# Patient Record
(demographics unavailable — no encounter records)

---

## 2024-11-09 NOTE — DISCUSSION/SUMMARY
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 She is clear from her sarcoma though with continued implant issues. Some instability is noted, likely due to a fragment from her implant, though this has settled in position, though this is being well-managed. A note has been provided for school accommodations, and she will continue follow-up as needed, with the next visit scheduled in six months. She is also monitored by survivorship as well.  If imaging or pathology/biopsy was ordered, the patient was told to make an appointment to review findings right after all imaging is completed.   We discussed risks, benefits and alternatives. Rationale of care was reviewed and all questions were answered.

## 2024-11-09 NOTE — DATA REVIEWED
[Imaging Present] : Present [de-identified] : X-rays today (11/07/2024), multiple views of the right knee and right tibia, show the proximal tibia replacement in position. The small free-floating piece of metal is now next to the proximal tibia replacement and stem junction, which is where it has been before. The patella is still subluxated laterally and superiorly.

## 2024-11-09 NOTE — DISCUSSION/SUMMARY
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 She is clear from her sarcoma though with continued implant issues. Some instability is noted, likely due to a fragment from her implant, though this has settled in position, though this is being well-managed. A note has been provided for school accommodations, and she will continue follow-up as needed, with the next visit scheduled in six months. She is also monitored by survivorship as well.  If imaging or pathology/biopsy was ordered, the patient was told to make an appointment to review findings right after all imaging is completed.   We discussed risks, benefits and alternatives. Rationale of care was reviewed and all questions were answered.

## 2024-11-09 NOTE — END OF VISIT
[FreeTextEntry3] : All medical record entries made by the Scribe were at my, Dr. Sulaiman Bedolla, direction and personally dictated by me on 11/07/2024. I have reviewed the chart and agree that the record accurately reflects my personal performance of the history, physical exam, assessment and plan. I have also personally directed, reviewed, and agreed with the chart.

## 2024-11-09 NOTE — ADDENDUM
[FreeTextEntry1] : I, Karrie Pink, documented this note as a scribe on behalf of Dr. Sulaiman Bedolla on 11/07/2024.

## 2024-11-09 NOTE — REASON FOR VISIT
[FreeTextEntry1] : Follow up of multiple previous surgeries: 12/19/2023 - I&D and closure over drain 11/29/2023 - Reduction and exchange of modular components after traumatic dislocation of proximal tibia replacement 1/3/2023 - stage 2 replacement of proximal tibia replacement / hinged knee 12/15/2022 - biopsy / culture right knee 9/22/2022 - resection of allograft and all hardware and placement of static antibiotic spacer with plastics 9/16/2019 - I&D of Right knee followed by delayed gastroc flap and skin graft. 2/6/2019 - radical resection right proximal tibia osteosarcoma with Allograft prosthetic composite reconstruction Dehiscence and wound VAC dressing over the proximal medial tibia allograft.

## 2024-11-09 NOTE — PHYSICAL EXAM
[General Appearance - Well-Appearing] : Well appearing [General Appearance - Well Nourished] : well nourished [Oriented To Time, Place, And Person] : Oriented to person, place, and time [Sclera] : the sclera and conjunctiva were normal [Neck Cervical Mass (___cm)] : no neck mass was observed [Heart Rate And Rhythm] : heart rate was normal and rhythm regular [Respiration, Rhythm And Depth] : normal respiratory rhythm and effort [Abdomen Soft] : Soft [Normal Station and Gait] : gait and station were normal [FreeTextEntry1] : Her exam remains unchanged, showing no active extension, a high-riding patella not connected to the tibia, and no tenderness or signs of infection at the junction site.

## 2024-11-09 NOTE — DATA REVIEWED
[Imaging Present] : Present [de-identified] : X-rays today (11/07/2024), multiple views of the right knee and right tibia, show the proximal tibia replacement in position. The small free-floating piece of metal is now next to the proximal tibia replacement and stem junction, which is where it has been before. The patella is still subluxated laterally and superiorly.

## 2024-11-09 NOTE — HISTORY OF PRESENT ILLNESS
[FreeTextEntry1] : Patient has only been getting stronger since last I saw her. She is doing PT. Her parents had some questions about her school accommodations, including a special chair at school.

## 2024-11-15 NOTE — HISTORY OF PRESENT ILLNESS
[Lower back] : lower back [Localized] : localized [4] : 4 [0] : 0 [Dull/Aching] : dull/aching [Intermittent] : intermittent [Meds] : meds [Student] : Work status: student [FreeTextEntry1] : 18 year F with PMHx significant for Autism, Right osteosarcoma s/p resection s/p multiple surgeries on the right leg, atrophic kidney, Pre DM A1c <8 presents for initial evaluation regarding their low back pain. Over the weekend she did walk more than usual, ~1 hour, and on monday she had pain in the back.   Current treatment: PT, Ibuprofen   Location: Middle lower back with radiation into the posterior thigh Quality: Ache, sharp Numbness/tingling: Denies Weakness: yes, chronic   Bowel/bladder dysfunction: Denies   Prior injections: Denies   Prior Treatments: Denies   Pertinent Surgical History: Denies   Anticoagulation: Denies     Patient denies current infection, fevers, or chills. Physician Disclaimer: I have personally reviewed and confirmed all HPI data with the patient. 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 Bedolla [de-identified] : PT 2x a week for the right leg  [de-identified] : JUSTIN MARCH

## 2024-11-15 NOTE — ASSESSMENT
[FreeTextEntry1] : 18 year F with complex PMHx significant for Autism, Right osteosarcoma s/p resection s/p multiple surgeries on the right leg, atrophic kidney, Pre DM A1c <8 presenting with pain in the back and right lower extremity.  At this time will recommend lumbar x-rays. Difficulty getting MRI for patient due to comorbidities. If x-rays WNL she can begin PT. For pain will recommend a MDP, family states no cardiac issues or DM. Will have them double check with their PCP that they can take the MDP prior to starting. Will see within one week.

## 2024-11-15 NOTE — PHYSICAL EXAM
[de-identified] : General: Appears well nourished and well developed, no acute distress Musculoskeletal: Wheelchair bound for community ambulation, walks with assistance Lumbar Spine Exam:                        Inspection:    erythema (-)   ecchymosis (-)   rashes (-)                          Palpation: Palpation/percussion at the midline lumbar levels reveals no tenderness                       Strength Testin/5 in the left lower extremity, 4/5 with left hip flexion, extension, 4/5 foot dorsiflexion and plantar flexion, 3/5 knee extension and flexion                      Sensation: Sensation to light touch grossly intact in the bilateral lower extremities                      Special Tests:  SLR: R (-) ; L (-), Facet loading: R (-) ; L (-)  SVETA test:

## 2024-12-30 NOTE — DISCUSSION/SUMMARY
[All Questions Answered] : Patient (and family) had all questions answered to an agreeable level of satisfaction [Interested in Proceeding] : Patient (and family) expressed understanding and interest in proceeding with the plan as outlined [de-identified] : Healing after an injury one month ago. Overall, things appear back to baseline and there do not appear to be any continuing issues from her injury. I will see her back again as needed, with her next visit scheduled for 4 months.  If imaging or pathology/biopsy was ordered, the patient was told to make an appointment to review findings right after all imaging is completed.   We discussed risks, benefits and alternatives. Rationale of care was reviewed and all questions were answered.

## 2024-12-30 NOTE — DATA REVIEWED
[Imaging Present] : Present [de-identified] : X-rays today (12/30/2024), multiple views of the right femur and tibia show the implant in position. There is still the break at the yoke, unchanged from before. The patella is still subluxated laterally and superiorly.

## 2024-12-30 NOTE — ADDENDUM
[FreeTextEntry1] : I, Micky Yang, documented this note as a scribe on behalf of Dr. Sulaiman Bedolla on 12/30/2024.
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
## 2024-12-30 NOTE — END OF VISIT
[FreeTextEntry3] : All medical record entries made by the Scribe were at my, Dr. Sulaiman Bedolla, direction and personally dictated by me on 12/30/2024. I have reviewed the chart and agree that the record accurately reflects my personal performance of the history, physical exam, assessment and plan. I have also personally directed, reviewed, and agreed with the chart.

## 2024-12-30 NOTE — PHYSICAL EXAM
[General Appearance - Well-Appearing] : Well appearing [General Appearance - Well Nourished] : well nourished [Oriented To Time, Place, And Person] : Oriented to person, place, and time [Sclera] : the sclera and conjunctiva were normal [Neck Cervical Mass (___cm)] : no neck mass was observed [Heart Rate And Rhythm] : heart rate was normal and rhythm regular [Respiration, Rhythm And Depth] : normal respiratory rhythm and effort [Abdomen Soft] : Soft [FreeTextEntry1] : On exam she has some pain on the front of her left knee but no residual bruising. She has no pain or swelling in the back of the right knee. All her incisions are clean, dry, and intact and she still has some instability at the right knee. Overall, she looks good and back to baseline with no active extension, a high-riding patella not connected to the tibia, and no tenderness or signs of infection at the junction site.

## 2025-02-27 NOTE — CONSULT LETTER
[Dear  ___] : Dear  [unfilled], [Courtesy Letter:] : I had the pleasure of seeing your patient, [unfilled], in my office today. [Please see my note below.] : Please see my note below. [Sincerely,] : Sincerely, [FreeTextEntry2] : Denisa Tirado [FreeTextEntry3] : THOMAS Hu-JUAN A Family Nurse Practitioner  [facility]  Pediatric Hematology/Oncology [facility] [phone] [email or web address]

## 2025-02-27 NOTE — REVIEW OF SYSTEMS
[Negative] : Allergic/Immunologic [FreeTextEntry3] : wears glasses [FreeTextEntry8] : See HPI [FreeTextEntry9] : See HPI [de-identified] : See HPI

## 2025-02-27 NOTE — PHYSICAL EXAM
[Obese] : obese [4] : was Sanya stage 4 [Cervical Lymph Nodes Enlarged Posterior Bilaterally] : posterior cervical [Supraclavicular Lymph Nodes Enlarged Bilaterally] : supraclavicular [Cervical Lymph Nodes Enlarged Anterior Bilaterally] : anterior cervical [Axillary Lymph Nodes Enlarged Bilaterally] : axillary [No focal deficits] : no focal deficits [Normal] : affect appropriate [80: Normal activity with effort; some signs or symptoms of disease.] : 80: Normal activity with effort; some signs or symptoms of disease.  [de-identified] : Wears glasses [de-identified] : + Facial hair [de-identified] : Mediport scar well healed [de-identified] : Right leg has extensive scarring and deformity of leg shape. Scars well healed, no swelling or pain noted.  [de-identified] : Well healed scars on right knee and tibia [de-identified] : Developmental delay

## 2025-02-27 NOTE — HISTORY OF PRESENT ILLNESS
[de-identified] : Eleuterio is a 18-year old F survivor of osteosarcoma of the R proximal tibia, diagnosed at 12.7 years old, and treated as per [id number] with chemotherapy (cumulative doxorubicin equivalent dosin mg/m2) & R tibial resection with placement of prosthetic. Eleuterio completed therapy in  and is now 5.5 years off-therapy. She was last seen by the primary oncology team in 2024, she presents today for her annual survivorship visit.     ELEUTERIO 's treatment course was complicated by poor healing following her limb salvage resection. Her treatment-related late-effects include: 1. Proximal tibia replacement / hinged knee prosthetic in 2023 s/p fall resulting in fracturing of her hardware. Developed post-op fluid collection in the area of hardware s/p antibiotic therapy. Eleuterio suffered a fall in 2024 and underwent some setbacks in her mobility. She has regained her strength since her injury and her xrays appear stable. Continues to receive physical therapy multiple times a week and is able to ambulate with her leg brace.  2. Hypomagnesemia- Eleuterio has a history of low magnesium for which she takes oral supplementation 3. Iron deficiency- Eleuterio takes daily oral iron supplementation 4. 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 Receives speech therapy and PT; has IEP in place.  2.Secondary amenorrhea - Followed by Anne, last seen in 2024. Likely related to hyperandrogegism secondary to increased BMI. Is currently on metformin TID to help regulate periods. Mother reports Eleuterio is still not menstruating or losing weight.    3. Obesity and type II diabetes - BMI 39, recent A1C 5.5%. Mother reports that Eleuterio's consumes a lot of fast food. Her physical activity is highly limited but she is active to the best of her ability.  4. Congenital atrophic kidney - Last saw Nephro Oct 2023, found to have normal creatinine. No routine follow up needed.  Eleuterio maintains good energy levels, denies recurrent fevers, bruising, bleeding, unintended weight loss, night sweats, new concerning swelling or masses, changes in skin lesions, and any other signs or symptoms suggestive of new or recurrent malignant disease. No recent hospitalizations or ED visits. No other physical concerns reported. ELEUTERIO is up to date with her annual primary care and semi-annual dental visits. She is overdue for a visit with cardio-oncology. She is up to date with all recommended vaccinations, including HPV, influenza, and COVID-19.  Eleuterio lives at home with her parents. She attends a [employer] program that has job site training 3 days a week. Eleuterio enjoys going to her program and spending time with her friends. She enjoys fast food and her parents verbalize frustration with her dietary choices.   [de-identified] : 450mg/m2 [de-identified] : yes [de-identified] : yes

## 2025-02-27 NOTE — PAST MEDICAL HISTORY
[Regular Cycle Intervals] : periods have been irregular [FreeTextEntry2] : On metformin to manage secondary amenhorrea

## 2025-04-18 NOTE — PHYSICAL EXAM
[Healthy Appearing] : healthy appearing [Well Nourished] : well nourished [Interactive] : interactive [Dysmorphic] : dysmorphic  [Overweight] : overweight [Normal Appearance] : normal appearance [Well formed] : well formed [Normally Set] : normally set [Normal S1 and S2] : normal S1 and S2 [Clear to Ausculation Bilaterally] : clear to auscultation bilaterally [Abdomen Soft] : soft [Abdomen Tenderness] : non-tender [] : no hepatosplenomegaly [4] : was Sanya stage 4 [Sanya Stage ___] : the Sanya stage for breast development was [unfilled] [Normal] : normal  [Murmur] : no murmurs [de-identified] : significant facial hirsutism under chin

## 2025-04-18 NOTE — HISTORY OF PRESENT ILLNESS
[FreeTextEntry1] : Decmber 2019 -menarche [FreeTextEntry2] : Sarah returns for evaluation of secondary amenorrhea.  She was born with hydrocephalus however did not require a shunt.  She has been noted to have a chromosomal abnormality( [id number] deletion syndrome) and is status post treatment for osteosarcoma.  She did receive an alkylating agent as part of her therapy.  Steve had her first period In December 2019, a few months after chemo, she then had a second period   2 months later and then regularly for 3 months.  Menses stopped in May 2020 Sarah was seen by caro  in 9/21.  At that time there was felt to be multiple possible reasons for oligomenorrhea.  Past medical history is significant for hydrocephalus that did not require a  shunt and osteosarcoma treatment.  Hydrocephalus can be a risk factor for hypopituitarism although Steve has appeared to have normal growth.  She is also at risk for gonadal dysfunction secondary to cis-platinum usage although she did experience menarche after chemotherapy was completed.  Obesity is also an additional risk factor for hyperandrogenism and menstrual irregularities. At that time blood work was all normal including normal free testosterone, gonadotropins, prolactin, thyroid function tests and anti-mullerian hormone. Sarah wa seen back in 9/23  Since  last visit time Sarah had rejection of a bone graft and needed a melita and cement placed with a prolonged course of antibiotics and rehab.  At th this time.  The leg is healed, mom stated however she has gained weight during recovery.  She had a couple of menstrual periods in later 2021 and once in 2022 Facial hair has developed in 2022. Stephanie has an atrophic kidney since birth but no other renal issues. Steve had a high fasting insulin and free testosterone. It was felt that she would be a good candidate for metformin if cleared from a renal standpoint. 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  Blood work is indicated hide fasting insulin and elevated free testosterone level, likely consistent with polycystic ovarian syndrome.  Metformin was suggested but was deferred due to surgery and high-dose antibiotic usage.  Today we will repeat fasting blood work.  As the patient will be traveling to [facility] for several weeks, if we are to begin metformin I would defer this until after she returns from her trip.   Sarah is an 18-year-old with secondary amenorrhea.  She has multiple risk factors including a history of chemo therapy, hydrocephalus, obesity with significant insulin resistance and hyperandrogenism, consistent with polycystic ovarian syndrome.  She is currently on metformin therapy.  Discussed the need for improvement in eating habits as metformin alone will likely not correct hyperinsulinism.  Will obtain fasting blood work. Do more walking, in PT, not eatng hethay   drinking dinrk with sugars, coffes are a bporbelms  compa food about 3 tiems per week   still has not had aperiod  qujatite vin ti be controlled

## 2025-07-01 NOTE — END OF VISIT
[FreeTextEntry3] : All medical record entries made by the Scribe were at my, Dr. Sulaiman Bedolla, direction and personally dictated by me on 07/01/2025. I have reviewed the chart and agree that the record accurately reflects my personal performance of the history, physical exam, assessment and plan. I have also personally directed, reviewed, and agreed with the chart.

## 2025-07-01 NOTE — DISCUSSION/SUMMARY
[All Questions Answered] : Patient (and family) had all questions answered to an agreeable level of satisfaction [Interested in Proceeding] : Patient (and family) expressed understanding and interest in proceeding with the plan as outlined [de-identified] : S/p multiple surgeries for right proximal tibia osteosarcoma and later infection. She does not want to do anyhting else at this point, which I think is appropraite given her history of infection and her overall status. We discussed many issues regarding physical therapy as well as her arthrosis. Assuming there are no problems, I would simply have her follow up in 6 months.  If imaging or pathology/biopsy was ordered, the patient was told to make an appointment to review findings right after all imaging is completed.   We discussed risks, benefits and alternatives. Rationale of care was reviewed and all questions were answered.

## 2025-07-01 NOTE — HISTORY OF PRESENT ILLNESS
[FreeTextEntry1] : 18-year-old survivor of osteosarcoma of the right proximal fibula diagnosed at age 12.  She was treated with doxorubicin 40 and 50 mg/m, cisplatin and methotrexate.  She presents for establishment of care in cardio oncology clinic.  She also has a history of :- - Dandy-Walker syndrome and lives with her parents.  She has developmental delays and speech and fine motor delays - hearing loss from her cancer treatment Obesity and type 2 diabetes Congenital atrophic kidney Borderline blood pressure Eats fast food, high salt food  Lipids February 2025 (triglycerides 258, cholesterol 187, HDL 44, ) Blood pressure 139/98 elevated Echo reviewed today ejection fraction normal no significant valvular issues GLS -20   TEB follow up 7/1/2025  salt reduction BP improved to 115-117/80s ROS: Denies Chest pain, dyspnea, palpitations, dizziness or syncope. Echo reviewed again in detail.

## 2025-07-01 NOTE — DATA REVIEWED
[Imaging Present] : Present [de-identified] : X-rays today (07/01/2025), multiple views of the right knee and right tibia, show the hinged proximal tibia replacement, unchanged from previous. There is no obvious lucency in the tibia. The cortex is thin, but there is more healing on the proximal side anteriorly. The junction site is unchanged. The patella is still very high and somewhat laterally dislocated.

## 2025-07-01 NOTE — HISTORY OF PRESENT ILLNESS
[FreeTextEntry1] : Patient is here for routine follow-up. She is still with survivorship and there is no sign of recurrence of her disease. She is working with physical therapy and getting stronger. She is using a brace often but in school is using a wheelchair still as they do not trust the brace there. She is not complaining of any pain and has no signs of infection. [1] : currently ~his/her~ pain is 1 out of 10

## 2025-07-01 NOTE — PHYSICAL EXAM
[General Appearance - Well-Appearing] : Well appearing [General Appearance - Well Nourished] : well nourished [Oriented To Time, Place, And Person] : Oriented to person, place, and time [Sclera] : the sclera and conjunctiva were normal [Neck Cervical Mass (___cm)] : no neck mass was observed [Heart Rate And Rhythm] : heart rate was normal and rhythm regular [Respiration, Rhythm And Depth] : normal respiratory rhythm and effort [Abdomen Soft] : Soft [Other: ___] : Uses [unfilled]  for ambulation. [FreeTextEntry1] : On exam all her her incisions are clean, dry, and intact. She is able to walk around with the brace, but without it she has no anterior support and her knee lizett. She has no active extension. Her patella is superior and lateral. Her ankle has better strength than before. With the brace, she is able to walk with a stiff leg circumduction gait.

## 2025-07-01 NOTE — ASSESSMENT
[FreeTextEntry1] : 19-year-old female with a history of Adriamycin exposure.  Congenital Dandy-Walker syndrome. Also, hypertension and hypertriglyceridemia prediabetes in the past  Echo reviewed today and was within normal limits. TEB today to follow up BP  # HTN BP improved with salt reduction BP log to continue  # elevated TG Fat/take out food to be decreased   # obesity working with endocrine message sent to review note  F/u cardio oncology in one year or PRN

## 2025-07-01 NOTE — ADDENDUM
[FreeTextEntry1] : I, Micky Yang, documented this note as a scribe on behalf of Dr. Sulaiman Bedolla on 07/01/2025.

## 2025-07-01 NOTE — REASON FOR VISIT
[Parents] : parents [Home] : at home, [unfilled] , at the time of the visit. [Medical Office: ([facility])___] : at the medical office located in  [Telehealth (audio & video)] : This visit was provided via telehealth using real-time 2-way audio visual technology. [Verbal consent obtained from patient] : the patient, [unfilled] [Symptom and Test Evaluation] : symptom and test evaluation

## 2025-07-01 NOTE — REVIEW OF SYSTEMS
[Nl] : Hematologic/Lymphatic [Joint Stiffness] : joint stiffness [Joint Pain] : no joint pain [Joint Swelling] : no joint swelling